# Patient Record
Sex: FEMALE | Race: BLACK OR AFRICAN AMERICAN | NOT HISPANIC OR LATINO
[De-identification: names, ages, dates, MRNs, and addresses within clinical notes are randomized per-mention and may not be internally consistent; named-entity substitution may affect disease eponyms.]

---

## 2017-06-26 ENCOUNTER — RESULT REVIEW (OUTPATIENT)
Age: 22
End: 2017-06-26

## 2020-08-03 ENCOUNTER — RESULT REVIEW (OUTPATIENT)
Age: 25
End: 2020-08-03

## 2020-12-28 ENCOUNTER — OUTPATIENT (OUTPATIENT)
Dept: INPATIENT UNIT | Facility: HOSPITAL | Age: 25
LOS: 1 days | Discharge: HOME | End: 2020-12-28

## 2020-12-28 VITALS — SYSTOLIC BLOOD PRESSURE: 124 MMHG | HEART RATE: 80 BPM | DIASTOLIC BLOOD PRESSURE: 71 MMHG

## 2020-12-28 VITALS — TEMPERATURE: 98 F

## 2020-12-28 DIAGNOSIS — Z3A.00 WEEKS OF GESTATION OF PREGNANCY NOT SPECIFIED: ICD-10-CM

## 2020-12-28 DIAGNOSIS — O26.899 OTHER SPECIFIED PREGNANCY RELATED CONDITIONS, UNSPECIFIED TRIMESTER: ICD-10-CM

## 2020-12-28 NOTE — OB PROVIDER TRIAGE NOTE - HISTORY OF PRESENT ILLNESS
24 yo  34 6/7 weeks sent in from the office for nonreassuring nst in the office, pt reports not currently feeling movement, however was feeling movement today. Denies pain, leaking of fluid, vaginal bleeding.     Currnet a/p complications: GDMA 26 yo  34 6/7 weeks sent in from the office for nonreactive nst in the office, pt reports not currently feeling movement, however was feeling movement today. Denies pain, leaking of fluid, vaginal bleeding. Pt did have an nst in [facility] where a deceleration was noted.    Current a/p complications: GDMA1    24 yo  34 6/7 weeks sent in from the office for nonreactive nst in the office, pt reports not currently feeling movement, however was feeling movement today. Denies pain, leaking of fluid, vaginal bleeding. Pt did have an nst in [facility] where a deceleration was noted.     Current a/p complications: GDMA1     Allergies: NKDA  Medications: PNV   PMH: GDMA1  PSH: D&C x 2   OB/GYN: sab x 1   top x 2 d&c x 2   Pap 2020 ASCUS, HPV positive- colpo done   Social: Denies   Psychosocial: Denies

## 2020-12-28 NOTE — OB PROVIDER TRIAGE NOTE - NSOBPROVIDERNOTE_OBGYN_ALL_OB_FT
d/w Dr. Marshall     -Cleared for discharge   -follow up with office in the morning   -fetal kick count reviewed   -warning signs reviewed

## 2020-12-28 NOTE — OB PROVIDER TRIAGE NOTE - NSHPPHYSICALEXAM_GEN_ALL_CORE
Vital Signs Last 24 Hrs  T(C): 36.8 (28 Dec 2020 19:23), Max: 36.8 (28 Dec 2020 19:20)  T(F): 98.2 (28 Dec 2020 19:23), Max: 98.24 (28 Dec 2020 19:20)  HR: 80 (28 Dec 2020 19:23) (80 - 80)  BP: 128/79 (28 Dec 2020 19:23) (128/79 - 128/79)  RR: 18 (28 Dec 2020 19:23) (18 - 18)    Abdomen soft, nontender     NST in progress     TAUS Vital Signs Last 24 Hrs  T(C): 36.8 (28 Dec 2020 19:23), Max: 36.8 (28 Dec 2020 19:20)  T(F): 98.2 (28 Dec 2020 19:23), Max: 98.24 (28 Dec 2020 19:20)  HR: 80 (28 Dec 2020 19:23) (80 - 80)  BP: 128/79 (28 Dec 2020 19:23) (128/79 - 128/79)  RR: 18 (28 Dec 2020 19:23) (18 - 18)    Abdomen soft, nontender     NST category 1 tracing, , moderate variability, + accels, no decels   no contractions by toco - tracing reviewed by  team (Dr. Marshall, Dr. Hernandez, Dr. Hidalgo)     TAUS cephalic presentation, posterior placenta, amita 14.04 cm, bpp 10/10

## 2020-12-28 NOTE — OB PROVIDER TRIAGE NOTE - ADDITIONAL INSTRUCTIONS
-Cleared for discharge   -follow up with office in the morning   -fetal kick count reviewed   -warning signs reviewed